# Patient Record
Sex: FEMALE | Race: WHITE | NOT HISPANIC OR LATINO | Employment: STUDENT | URBAN - METROPOLITAN AREA
[De-identification: names, ages, dates, MRNs, and addresses within clinical notes are randomized per-mention and may not be internally consistent; named-entity substitution may affect disease eponyms.]

---

## 2017-04-15 ENCOUNTER — OFFICE VISIT (OUTPATIENT)
Dept: URGENT CARE | Facility: CLINIC | Age: 22
End: 2017-04-15
Payer: COMMERCIAL

## 2017-04-15 VITALS
DIASTOLIC BLOOD PRESSURE: 80 MMHG | HEART RATE: 102 BPM | SYSTOLIC BLOOD PRESSURE: 110 MMHG | WEIGHT: 123 LBS | OXYGEN SATURATION: 99 % | TEMPERATURE: 98.4 F | BODY MASS INDEX: 19.26 KG/M2 | RESPIRATION RATE: 16 BRPM

## 2017-04-15 DIAGNOSIS — R05.8 NON-PRODUCTIVE COUGH: ICD-10-CM

## 2017-04-15 PROCEDURE — 99214 OFFICE O/P EST MOD 30 MIN: CPT | Performed by: PHYSICIAN ASSISTANT

## 2017-04-15 RX ORDER — PROMETHAZINE HYDROCHLORIDE AND CODEINE PHOSPHATE 6.25; 1 MG/5ML; MG/5ML
5-10 SYRUP ORAL 3 TIMES DAILY PRN
Qty: 150 ML | Refills: 0 | Status: SHIPPED | OUTPATIENT
Start: 2017-04-15 | End: 2020-07-22

## 2017-04-15 RX ORDER — BENZONATATE 200 MG/1
200 CAPSULE ORAL 3 TIMES DAILY PRN
Qty: 21 CAP | Refills: 0 | Status: SHIPPED | OUTPATIENT
Start: 2017-04-15 | End: 2020-07-22

## 2017-04-15 RX ORDER — AZITHROMYCIN 250 MG/1
TABLET, FILM COATED ORAL
Qty: 6 TAB | Refills: 1 | Status: SHIPPED | OUTPATIENT
Start: 2017-04-15 | End: 2020-07-22

## 2017-04-15 ASSESSMENT — ENCOUNTER SYMPTOMS
SHORTNESS OF BREATH: 0
COUGH: 1
CARDIOVASCULAR NEGATIVE: 1
FEVER: 0
CONSTITUTIONAL NEGATIVE: 1
SPUTUM PRODUCTION: 0
EYES NEGATIVE: 1
SORE THROAT: 0

## 2017-04-15 NOTE — PROGRESS NOTES
Subjective:      Ally Perez is a 21 y.o. female who presents with Cough            Cough  This is a new problem. The current episode started in the past 7 days. The problem has been unchanged. The problem occurs every few minutes. The cough is non-productive. Pertinent negatives include no fever, nasal congestion, sore throat or shortness of breath. Nothing aggravates the symptoms. She has tried nothing for the symptoms. The treatment provided no relief. There is no history of asthma or environmental allergies.       Review of Systems   Constitutional: Negative.  Negative for fever.   HENT: Negative.  Negative for sore throat.    Eyes: Negative.    Respiratory: Positive for cough. Negative for sputum production and shortness of breath.    Cardiovascular: Negative.    Skin: Negative.    Endo/Heme/Allergies: Negative for environmental allergies.          Objective:     /80 mmHg  Pulse 102  Temp(Src) 36.9 °C (98.4 °F)  Resp 16  Wt 55.792 kg (123 lb)  SpO2 99%     Physical Exam   Constitutional: She is oriented to person, place, and time. She appears well-developed and well-nourished. No distress.   HENT:   Head: Normocephalic and atraumatic.   Mouth/Throat: Oropharynx is clear and moist.   Eyes: EOM are normal. Pupils are equal, round, and reactive to light.   Neck: Normal range of motion. Neck supple.   Cardiovascular: Normal rate.    Pulmonary/Chest: Effort normal and breath sounds normal. No respiratory distress. She has no wheezes. She has no rales.   Lymphadenopathy:     She has no cervical adenopathy.   Neurological: She is alert and oriented to person, place, and time.   Skin: Skin is warm and dry.   Psychiatric: She has a normal mood and affect. Her behavior is normal. Judgment and thought content normal.   Nursing note and vitals reviewed.    Filed Vitals:    04/15/17 1057   BP: 110/80   Pulse: 102   Temp: 36.9 °C (98.4 °F)   Resp: 16   Weight: 55.792 kg (123 lb)   SpO2: 99%     Active  Ambulatory Problems     Diagnosis Date Noted   • Family planning 01/04/2016     Resolved Ambulatory Problems     Diagnosis Date Noted   • No Resolved Ambulatory Problems     Past Medical History   Diagnosis Date   • Migraine      Current Outpatient Prescriptions on File Prior to Visit   Medication Sig Dispense Refill   • SPRINTEC 28 0.25-35 MG-MCG per tablet TAKE ONE TABLET BY MOUTH ONCE DAILY 28 Tab 11     No current facility-administered medications on file prior to visit.     Gargles, Cepacol lozenges, Aleve/Advil as needed for throat pain  History reviewed. No pertinent family history.  Review of patient's allergies indicates no known allergies.              Assessment/Plan:     1. Non-productive cough     - benzonatate (TESSALON) 200 MG capsule; Take 1 Cap by mouth 3 times a day as needed for Cough.  Dispense: 21 Cap; Refill: 0  - azithromycin (ZITHROMAX) 250 MG Tab; z-dimitrios; U.D.  Dispense: 6 Tab; Refill: 1

## 2017-04-15 NOTE — MR AVS SNAPSHOT
Ally Perez   4/15/2017 10:55 AM   Office Visit   MRN: [id number]    Department:  [facility]   Dept Phone:  [phone]    Description:  Female : 1995   Provider:  Morris Naik PA-C           Reason for Visit     Cough x 3 wks, some productive cough, hard to breath      Allergies as of 4/15/2017     No Known Allergies      You were diagnosed with     Non-productive cough   [[id number]]         Vital Signs     Blood Pressure Pulse Temperature Respirations Weight Oxygen Saturation    110/80 mmHg 102 36.9 °C (98.4 °F) 16 55.792 kg (123 lb) 99%    Smoking Status                   Never Smoker            Basic Information     Date Of Birth Sex Race Ethnicity Preferred Language    1995 Female White Non- English      Problem List              ICD-10-CM Priority Class Noted - Resolved    Family planning Z30.09   2016 - Present      Health Maintenance        Date Due Completion Dates    IMM HEP B VACCINE (1 of 3 - Primary Series) 1997 ---    IMM HEP A VACCINE (1 of 2 - Standard Series) 1998 ---    IMM HPV VACCINE (1 of 3 - Female 3 Dose Series) 2008 ---    IMM VARICELLA (CHICKENPOX) VACCINE (2 of 2 - 2 Dose Childhood Series) 2009    IMM MENINGOCOCCAL VACCINE (MCV4) (1 of 1) 2013 ---    IMM DTaP/Tdap/Td Vaccine (2 - Td) 2019            Current Immunizations     Tdap Vaccine 2009    Varicella Vaccine Live 2009      Below and/or attached are the medications your provider expects you to take. Review all of your home medications and newly ordered medications with your provider and/or pharmacist. Follow medication instructions as directed by your provider and/or pharmacist. Please keep your medication list with you and share with your provider. Update the information when medications are discontinued, doses are changed, or new medications (including over-the-counter products) are added; and carry medication information at all times in  the event of emergency situations     Allergies:  No Known Allergies          Medications  Valid as of: April 15, 2017 - 11:15 AM    Generic Name Brand Name Tablet Size Instructions for use    Azithromycin (Tab) ZITHROMAX 250 MG z-dimitrios; U.D.        Benzonatate (Cap) TESSALON 200 MG Take 1 Cap by mouth 3 times a day as needed for Cough.        Norgestimate-Eth Estradiol (Tab) SPRINTEC 28 0.25-35 MG-MCG TAKE ONE TABLET BY MOUTH ONCE DAILY        Promethazine-Codeine (Syrup) PHENERGAN-CODEINE 6.25-10 MG/5ML Take 5-10 mL by mouth 3 times a day as needed for Cough (or use qhs).        .                 Medicines prescribed today were sent to:     [id number] - [place], [place] - [address]    [address] [place] [address]    Phone: [phone] Fax: [phone]    Open 24 Hours?: No    [facility] [address] [place] [address] [address] [address], [place] [address]    [address]  [employer], [address] [address]    Phone: [phone] Fax: [phone]    Open 24 Hours?: No      Medication refill instructions:       If your prescription bottle indicates you have medication refills left, it is not necessary to call your provider’s office. Please contact your pharmacy and they will refill your medication.    If your prescription bottle indicates you do not have any refills left, you may request refills at any time through one of the following ways: The online [employer] system (except Urgent Care), by calling your provider’s office, or by asking your pharmacy to contact your provider’s office with a refill request. Medication refills are processed only during regular business hours and may not be available until the next business day. Your provider may request additional information or to have a follow-up visit with you prior to refilling your medication.   *Please Note: Medication refills are assigned a new Rx number when refilled electronically. Your pharmacy may indicate that no refills were authorized even though a new  prescription for the same medication is available at the pharmacy. Please request the medicine by name with the pharmacy before contacting your provider for a refill.           MyChart Access Code: Activation code not generated  Current MyChart Status: Active

## 2017-09-22 RX ORDER — NORGESTIMATE AND ETHINYL ESTRADIOL 0.25-0.035
KIT ORAL
Qty: 84 TAB | Refills: 0 | Status: SHIPPED | OUTPATIENT
Start: 2017-09-22 | End: 2020-07-22

## 2017-09-22 NOTE — TELEPHONE ENCOUNTER
Was the patient seen in the last year in this department? No 1/04/16    Does patient have an active prescription for medications requested? No     Received Request Via: Pharmacy

## 2019-08-12 ENCOUNTER — GYNECOLOGY VISIT (OUTPATIENT)
Dept: OBGYN | Facility: CLINIC | Age: 24
End: 2019-08-12
Payer: COMMERCIAL

## 2019-08-12 ENCOUNTER — HOSPITAL ENCOUNTER (OUTPATIENT)
Facility: MEDICAL CENTER | Age: 24
End: 2019-08-12
Attending: NURSE PRACTITIONER
Payer: COMMERCIAL

## 2019-08-12 VITALS — SYSTOLIC BLOOD PRESSURE: 90 MMHG | DIASTOLIC BLOOD PRESSURE: 60 MMHG | WEIGHT: 118 LBS | BODY MASS INDEX: 18.48 KG/M2

## 2019-08-12 DIAGNOSIS — Z01.419 WELL WOMAN EXAM WITH ROUTINE GYNECOLOGICAL EXAM: Primary | ICD-10-CM

## 2019-08-12 DIAGNOSIS — Z01.419 WELL WOMAN EXAM WITH ROUTINE GYNECOLOGICAL EXAM: ICD-10-CM

## 2019-08-12 PROCEDURE — 87491 CHLMYD TRACH DNA AMP PROBE: CPT

## 2019-08-12 PROCEDURE — 90651 9VHPV VACCINE 2/3 DOSE IM: CPT | Performed by: NURSE PRACTITIONER

## 2019-08-12 PROCEDURE — 87591 N.GONORRHOEAE DNA AMP PROB: CPT

## 2019-08-12 PROCEDURE — 99385 PREV VISIT NEW AGE 18-39: CPT | Mod: 25 | Performed by: NURSE PRACTITIONER

## 2019-08-12 PROCEDURE — 88175 CYTOPATH C/V AUTO FLUID REDO: CPT

## 2019-08-12 PROCEDURE — 90471 IMMUNIZATION ADMIN: CPT | Performed by: NURSE PRACTITIONER

## 2019-08-12 RX ORDER — NORGESTIMATE AND ETHINYL ESTRADIOL 7DAYSX3 28
1 KIT ORAL DAILY
Qty: 28 TAB | Refills: 11 | Status: SHIPPED
Start: 2019-08-12 | End: 2020-07-22

## 2019-08-12 NOTE — PROGRESS NOTES
Ally Perez is a 24 y.o. y.o. female who presents for her Gynecologic Exam        HPI Comments: Pt presents for well woman exam. Pt has complaints of increasing acne since stopping her BCP 6 months ago. Would like another birth control to try to help. Patient's last menstrual period was 08/02/2019 (exact date).    Ally is here to establish care, get her first pap smear done, discuss birth control, and also to start the HPV series.  She is not currently sexually active, but has been in the past with male partners only. No concerns for STI or other infection.  She was on Sprintec until about 6 months ago. No pregnancy history.    Discussed with patient today were forms of birth control. We reviewed birth control pills and their use, risks benefits and side effects. I reviewed Depo-Provera use as well as risk-benefit side effect, I also discussed with the patient NuvaRing risks benefits and side effects. We also discussed IUDs, discussed progesterone containing IUDs such as Mirena and [place]. I also discussed ParaGard IUD. I discussed risks benefits and side effects of all these medications. We also discussed Nexplanon, subdermal implant, risks benefits and side effects.  Also discussed with patient were barrier methods especially condoms for prevention of STDs.      Review of Systems   Pertinent positives documented in HPI and all other systems reviewed & are negative    All PMH, PSH, allergies, social history and FH reviewed and updated today:  Past Medical History:   Diagnosis Date   • Migraine      History reviewed. No pertinent surgical history.  Patient has no known allergies.  Social History     Socioeconomic History   • Marital status: Single     Spouse name: Not on file   • Number of children: Not on file   • Years of education: Not on file   • Highest education level: Not on file   Occupational History   • Not on file   Social Needs   • Financial resource strain: Not on file   • Food insecurity:     Worry:  Not on file     Inability: Not on file   • Transportation needs:     Medical: Not on file     Non-medical: Not on file   Tobacco Use   • Smoking status: Never Smoker   • Smokeless tobacco: Never Used   Substance and Sexual Activity   • Alcohol use: Yes     Comment: once a week   • Drug use: No   • Sexual activity: Not Currently     Partners: Male     Comment: none   Lifestyle   • Physical activity:     Days per week: Not on file     Minutes per session: Not on file   • Stress: Not on file   Relationships   • Social connections:     Talks on phone: Not on file     Gets together: Not on file     Attends Confucianist service: Not on file     Active member of club or organization: Not on file     Attends meetings of clubs or organizations: Not on file     Relationship status: Not on file   • Intimate partner violence:     Fear of current or ex partner: Not on file     Emotionally abused: Not on file     Physically abused: Not on file     Forced sexual activity: Not on file   Other Topics Concern   • Behavioral problems Not Asked   • Interpersonal relationships Not Asked   • Sad or not enjoying activities Not Asked   • Suicidal thoughts Not Asked   • Poor school performance Not Asked   • Reading difficulties Not Asked   • Speech difficulties Not Asked   • Writing difficulties Not Asked   • Inadequate sleep Not Asked   • Excessive TV viewing Not Asked   • Excessive video game use Not Asked   • Inadequate exercise Not Asked   • Sports related Not Asked   • Poor diet Not Asked   • Family concerns for drug/alcohol abuse Not Asked   • Poor oral hygiene Not Asked   • Bike safety Not Asked   • Family concerns vehicle safety Not Asked   Social History Narrative   • Not on file     History reviewed. No pertinent family history.  Medications:   Current Outpatient Medications Ordered in Epic   Medication Sig Dispense Refill   • Norgestim-Eth Estrad Triphasic 0.18/0.215/0.25 MG-35 MCG Tab Take 1 Tab by mouth every day. 28 Tab 11   •  norgestimate-ethinyl estradiol (SPRINTEC 28) 0.25-35 MG-MCG per tablet TAKE ONE TABLET BY MOUTH ONCE DAILY (Patient not taking: Reported on 8/12/2019) 84 Tab 0   • benzonatate (TESSALON) 200 MG capsule Take 1 Cap by mouth 3 times a day as needed for Cough. (Patient not taking: Reported on 8/12/2019) 21 Cap 0   • azithromycin (ZITHROMAX) 250 MG Tab z-dimitrios; U.D. (Patient not taking: Reported on 8/12/2019) 6 Tab 1   • promethazine-codeine (PHENERGAN-CODEINE) 6.25-10 MG/5ML Syrup Take 5-10 mL by mouth 3 times a day as needed for Cough (or use qhs). (Patient not taking: Reported on 8/12/2019) 150 mL 0     No current Epic-ordered facility-administered medications on file.           Objective:   Vital measurements:  BP (!) 90/60   Wt 53.5 kg (118 lb)   Body mass index is 18.48 kg/m². (Goal BM I>18 <25)    Physical Exam     Chaperone offered: yes    Nursing note and vitals reviewed.  Constitutional: She is oriented to person, place, and time. She appears well-developed and well-nourished. No distress.     HEENT:   Head: Normocephalic and atraumatic.   Right Ear: External ear normal.   Left Ear: External ear normal.   Nose: Nose normal.   Eyes: Conjunctivae and EOM are normal. Pupils are equal, round, and reactive to light. No scleral icterus.     Neck: Normal range of motion. Neck supple. No tracheal deviation present. No thyromegaly present.     Pulmonary/Chest: Effort normal and breath sounds normal. No respiratory distress. She has no wheezes. She has no rales. She exhibits no tenderness.     Cardiovascular: Regular, rate and rhythm. No JVD.    Abdominal: Soft. Bowel sounds are normal. She exhibits no distension and no mass. No tenderness. She has no rebound and no guarding.     Breast:  Symmetrical, normal consistency without masses., No dimpling or skin changes, Normal nipples without discharge, no axillary lymphadenopathy, negative, Inspection negative. No nipple discharge or bleeding, No  masses    Genitourinary:  Pelvic exam was performed with patient supine.  External genitalia with no abnormal pigmentation, labial fusion,rash, tenderness, lesion or injury to the labia bilaterally.  Vagina is moist with no lesions, foul discharge, erythema, tenderness or bleeding. No foreign body around the vagina or signs of injury.   Cervix exhibits no motion tenderness, no discharge and no friability.   Uterus is mid-position, not deviated, not enlarged, not fixed and not tender.  Right adnexum displays no mass, no tenderness and no fullness. Left adnexum displays no mass, no tenderness and no fullness.     Musculoskeletal: Normal range of motion. She exhibits no edema and no tenderness.     Lymphadenopathy: She has no cervical adenopathy.     Neurological: She is alert and oriented to person, place, and time. She exhibits normal muscle tone.     Skin: Skin is warm and dry. No rash noted. She is not diaphoretic. No erythema. No pallor.     Psychiatric: She has a normal mood and affect. Her behavior is normal. Judgment and thought content normal.      Assessment:     1. Well woman exam with routine gynecological exam  THINPREP RFLX HPV ASCUS W/CTNG    9VHPV Vaccine 2-3 Dose IM     Plan:   Pap and physical exam performed  Monthly SBE.  Counseling: breast self exam, STD prevention and use and side effects of OCPs  Encourage exercise and proper diet.  Mammograms starting @ age 40 annually.  See medications and orders placed in encounter report.    Follow up annually or sooner ANDREAS Longo CNM, APRN

## 2019-08-15 LAB
C TRACH DNA GENITAL QL NAA+PROBE: NEGATIVE
CYTOLOGY REG CYTOL: NORMAL
N GONORRHOEA DNA GENITAL QL NAA+PROBE: NEGATIVE
SPECIMEN SOURCE: NORMAL

## 2019-09-03 ENCOUNTER — TELEPHONE (OUTPATIENT)
Dept: OBGYN | Facility: CLINIC | Age: 24
End: 2019-09-03

## 2019-09-03 DIAGNOSIS — Z30.41 ENCOUNTER FOR SURVEILLANCE OF CONTRACEPTIVE PILLS: ICD-10-CM

## 2019-09-03 RX ORDER — NORGESTIMATE AND ETHINYL ESTRADIOL 7DAYSX3 LO
1 KIT ORAL DAILY
Qty: 28 TAB | Refills: 0 | Status: SHIPPED | OUTPATIENT
Start: 2019-09-03 | End: 2020-07-22 | Stop reason: SDUPTHER

## 2019-09-03 NOTE — TELEPHONE ENCOUNTER
Received a call from Nury from pharmacy needing to clarify Rx for birth control. He needs to know brand name and if it's regular or Lo. Per Dr. Lund it is Ortho-Tri Cyclen regular. Nury notified and he had no other questions

## 2019-09-03 NOTE — TELEPHONE ENCOUNTER
Pt called requesting an Rx sent for her BC @ [facility] in [place] for 1 refill.

## 2019-09-10 ENCOUNTER — HOSPITAL ENCOUNTER (OUTPATIENT)
Facility: MEDICAL CENTER | Age: 24
End: 2019-09-10
Attending: DERMATOLOGY
Payer: COMMERCIAL

## 2019-09-10 PROCEDURE — 87205 SMEAR GRAM STAIN: CPT

## 2019-09-10 PROCEDURE — 87070 CULTURE OTHR SPECIMN AEROBIC: CPT

## 2019-09-11 ENCOUNTER — APPOINTMENT (RX ONLY)
Dept: URBAN - METROPOLITAN AREA CLINIC 36 | Facility: CLINIC | Age: 24
Setting detail: DERMATOLOGY
End: 2019-09-11

## 2019-09-11 DIAGNOSIS — L70.0 ACNE VULGARIS: ICD-10-CM

## 2019-09-11 PROCEDURE — ? PULSED-DYE LASER

## 2019-09-11 PROCEDURE — ? PRESCRIPTION

## 2019-09-11 PROCEDURE — ? ORDER TESTS

## 2019-09-11 RX ORDER — CLINDAMYCIN PHOSPHATE 10 MG/ML
LOTION TOPICAL
Qty: 1 | Refills: 6 | Status: ERX | COMMUNITY
Start: 2019-09-11

## 2019-09-11 RX ORDER — MINOCYCLINE HYDROCHLORIDE 100 MG/1
CAPSULE ORAL
Qty: 90 | Refills: 3 | Status: ERX | COMMUNITY
Start: 2019-09-11

## 2019-09-11 RX ADMIN — MINOCYCLINE HYDROCHLORIDE: 100 CAPSULE ORAL at 01:28

## 2019-09-11 RX ADMIN — CLINDAMYCIN PHOSPHATE: 10 LOTION TOPICAL at 01:17

## 2019-09-11 ASSESSMENT — LOCATION DETAILED DESCRIPTION DERM: LOCATION DETAILED: LEFT INFERIOR CENTRAL MALAR CHEEK

## 2019-09-11 ASSESSMENT — LOCATION SIMPLE DESCRIPTION DERM: LOCATION SIMPLE: LEFT CHEEK

## 2019-09-11 ASSESSMENT — LOCATION ZONE DERM: LOCATION ZONE: FACE

## 2019-09-11 NOTE — PROCEDURE: PULSED-DYE LASER
Delay Time (Ms): 20
Spot Size: 10 mm
Cryogen Time (Ms): 30
Laser Type: Vbeam 595nm
Pulse Duration: 6 ms
Post-Care Instructions: I reviewed with the patient in detail post-care instructions. Patient should stay away from the sun and wear sun protection until treated areas are fully healed.
Treated Area: medium area
Spot Size: 7 mm
Pulse Duration: 10 ms
Fluence In J/Cm2 (Optional): 6
Post-Procedure Care: [place] applied . Post care reviewed with patient.
Immediate Endpoint: erythema
Detail Level: Zone
Location Override: cheeks
Consent: Written consent obtained, risks reviewed including but not limited to crusting, scabbing, blistering, scarring, darker or lighter pigmentary change, incidental hair removal, bruising, and/or incomplete removal.

## 2019-09-11 NOTE — PROCEDURE: ORDER TESTS
Performing Laboratory: [id number]
Bill For Surgical Tray: no
Expected Date Of Service: 09/10/2019
Billing Type: Third-Party Bill

## 2019-09-12 LAB
AMBIGUOUS DTTM AMBI4: NORMAL
GRAM STN SPEC: NORMAL
SIGNIFICANT IND 70042: NORMAL
SIGNIFICANT IND 70042: NORMAL
SITE SITE: NORMAL
SITE SITE: NORMAL
SOURCE SOURCE: NORMAL
SOURCE SOURCE: NORMAL

## 2019-09-14 LAB
BACTERIA WND AEROBE CULT: NORMAL
GRAM STN SPEC: NORMAL
SIGNIFICANT IND 70042: NORMAL
SITE SITE: NORMAL
SOURCE SOURCE: NORMAL

## 2019-10-23 ENCOUNTER — TELEPHONE (OUTPATIENT)
Dept: OBGYN | Facility: CLINIC | Age: 24
End: 2019-10-23

## 2019-10-23 NOTE — TELEPHONE ENCOUNTER
Giselle, pt's mother called requesting to get a 12 month supply of pt's BC, states she is studying abroad and mom will ship medication .    I called pharmacy and approve refill,

## 2020-07-22 ENCOUNTER — OFFICE VISIT (OUTPATIENT)
Dept: MEDICAL GROUP | Facility: MEDICAL CENTER | Age: 25
End: 2020-07-22
Payer: COMMERCIAL

## 2020-07-22 VITALS
DIASTOLIC BLOOD PRESSURE: 58 MMHG | RESPIRATION RATE: 16 BRPM | HEIGHT: 68 IN | HEART RATE: 102 BPM | WEIGHT: 117.06 LBS | OXYGEN SATURATION: 98 % | BODY MASS INDEX: 17.74 KG/M2 | TEMPERATURE: 98.4 F | SYSTOLIC BLOOD PRESSURE: 98 MMHG

## 2020-07-22 DIAGNOSIS — Z23 NEED FOR VACCINATION: ICD-10-CM

## 2020-07-22 DIAGNOSIS — Z00.00 PREVENTATIVE HEALTH CARE: ICD-10-CM

## 2020-07-22 DIAGNOSIS — R55 VASOVAGAL NEAR SYNCOPE: ICD-10-CM

## 2020-07-22 DIAGNOSIS — Z30.41 ENCOUNTER FOR SURVEILLANCE OF CONTRACEPTIVE PILLS: ICD-10-CM

## 2020-07-22 DIAGNOSIS — R53.83 FATIGUE, UNSPECIFIED TYPE: ICD-10-CM

## 2020-07-22 PROCEDURE — 90471 IMMUNIZATION ADMIN: CPT | Performed by: INTERNAL MEDICINE

## 2020-07-22 PROCEDURE — 99204 OFFICE O/P NEW MOD 45 MIN: CPT | Mod: 25 | Performed by: INTERNAL MEDICINE

## 2020-07-22 PROCEDURE — 90651 9VHPV VACCINE 2/3 DOSE IM: CPT | Performed by: INTERNAL MEDICINE

## 2020-07-22 PROCEDURE — 93000 ELECTROCARDIOGRAM COMPLETE: CPT | Performed by: INTERNAL MEDICINE

## 2020-07-22 RX ORDER — NORGESTIMATE AND ETHINYL ESTRADIOL 7DAYSX3 LO
1 KIT ORAL DAILY
Qty: 84 TAB | Refills: 3 | Status: SHIPPED | OUTPATIENT
Start: 2020-07-22 | End: 2020-11-05 | Stop reason: SDUPTHER

## 2020-07-22 ASSESSMENT — PATIENT HEALTH QUESTIONNAIRE - PHQ9: CLINICAL INTERPRETATION OF PHQ2 SCORE: 0

## 2020-07-22 NOTE — PROGRESS NOTES
CC:  Diagnoses of Need for vaccination, Fatigue, unspecified type, Preventative health care, and Encounter for surveillance of contraceptive pills were pertinent to this visit.    HISTORY OF THE PRESENT ILLNESS: Patient is a 24 y.o. female. This pleasant patient is here today to establish care.     She says she feels well overall.  With the pandemic she is back home with her parents.  Trying to stay busy.    Has had some general fatigue, she cannot pinpoint the source, feels like she has some decreased concentration such as when she is watching TV.  Sleep is good, 7 to 8 hours nightly.  She says her diet is very healthy, vegetarian.  Denies any stress or depression.  Only medication is birth control.  She says her menstrual cycles are at normal intervals, no excessive blood loss.  No cardiopulmonary, GI symptoms.  No focal muscle/joint or skin changes, no hair loss.  No other associated change.  Heart rate a little elevated in clinic, she says sometimes she gets little dizzy when she stands quickly from a seated position, she will hydrate more and see if this resolves.    Regarding preventive health she is agreeable to have her HPV vaccine today.  She will research Trumenba, which was also recommended.  Pap smear is up-to-date, she has no current gynecologic concerns.    Did have history of migraines when she was going through puberty but these have resolved and have not recurred over the past 4 years.  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    Allergies: Patient has no known allergies.    Current Outpatient Medications Ordered in Epic   Medication Sig Dispense Refill   • Norgestim-Eth Estrad Triphasic 0.18/0.215/0.25 MG-25 MCG Tab Take 1 Cap by mouth every day. 84 Tab 3     No current Epic-ordered facility-administered medications on file.        Past Medical History:   Diagnosis Date   • Migraine        History reviewed. No pertinent surgical history.    Social History     Tobacco Use   •  "Smoking status: Never Smoker   • Smokeless tobacco: Never Used   Substance Use Topics   • Alcohol use: Yes     Comment: once a week   • Drug use: No       Social History     Social History Narrative   • Not on file       Family History   Problem Relation Age of Onset   • No Known Problems Father    • No Known Problems Brother    • No Known Problems Brother    • No Known Problems Mother        ROS:     - Constitutional: Negative for fever, chills, unexpected weight change, night sweats    - Eyes:   Negative for blurry vision, eye pain, discharge    - ENT:  Negative for hearing changes, ear pain, ear discharge, rhinorrhea, sinus congestion, sore throat     - Respiratory: Negative for cough, sputum production, chest congestion, dyspnea, wheezing, and crackles.      - Cardiovascular: Negative for chest pain, palpitations, orthopnea, and bilateral lower extremity edema.     - Gastrointestinal: Negative for heartburn, nausea, vomiting, abdominal pain, hematochezia, melena, diarrhea, constipation, and greasy/foul-smelling stools.     - Genitourinary: Negative for dysuria, polyuria, hematuria, pyuria, urinary urgency, and urinary incontinence.     - Musculoskeletal: Negative for myalgias, back pain, and joint pain.     - Skin: Negative for rash, itching, cyanotic skin color change.     - Neurological: Negative for migraines, numbness, ataxia, tremors, vertigo    - Endo:Negative for polyuria, heat/cold intolerance, excessive thirst    - Hem/lymphatic: Negative for easy bruising, blood clots, lymphedema, swollen glands    -Allergic/immun: Negative for allergic rhinitis    - Psychiatric/Behavioral: Negative for depression, suicidal/homicidal ideation and memory loss.      Exam: /56 (BP Location: Right arm, Patient Position: Sitting, BP Cuff Size: Adult)   Pulse (!) 102   Temp 36.9 °C (98.4 °F) (Temporal)   Resp 16   Ht 1.727 m (5' 8\")   Wt 53.1 kg (117 lb 1 oz)   SpO2 98%  Body mass index is 17.8 kg/m².    General: " Normal appearing. No distress.  EYES: Conjunctiva clear lids without ptosis, pupils equal  EARS: Normal shape and contour   NOSE, THROAT: nasal mucosa benign. oropharynx is without erythema, edema or exudates.   Neck: Supple without LAD. Thyroid is not enlarged.  Pulmonary: Clear to ausculation.  Normal effort. No rales or wheezing.  Cardiovascular: Regular rate and rhythm without significant murmur.   Abdomen: Soft, nontender, nondistended. Normal bowel sounds.  Neurologic: Cranial nerves grossly nonfocal  Lymph: No cervical, supraclavicular nodes palpable  Skin: Warm and dry.  No obvious lesions.  Musculoskeletal: Normal gait. No extremity cyanosis, clubbing, or edema.  Psych: Normal mood and affect. Alert and oriented x3. Judgment and insight is normal.    EKG today in clinic sinus bradycardia rate of 53,  ms, I do not appreciate any signs of Brugada, preexcitation, or other acute changes.    Assessment/Plan  1. Need for vaccination  She will make a medical assistant appointment to complete HPV vaccine, meningococcal, etc.  - Gardasil 9    2. Fatigue, unspecified type  Unclear cause of the generalized mild fatigue.  For now we will plan to check labs, especially B12 with her vegetarian status.  - CBC WITH DIFFERENTIAL; Future  - Comp Metabolic Panel; Future  - TSH; Future  - FREE THYROXINE; Future  - VITAMIN D,25 HYDROXY; Future  - VITAMIN B12; Future    3. Preventative health care  - CBC WITH DIFFERENTIAL; Future  - Comp Metabolic Panel; Future  - Lipid Profile; Future  - TSH; Future  - FREE THYROXINE; Future  - VITAMIN D,25 HYDROXY; Future  - VITAMIN B12; Future    4. Encounter for surveillance of contraceptive pills  - Norgestim-Eth Estrad Triphasic 0.18/0.215/0.25 MG-25 MCG Tab; Take 1 Cap by mouth every day.  Dispense: 84 Tab; Refill: 3    5.  Vasovagal  After appointment patient did receive her HPV vaccine.  Following the vaccine she started to become very dizzy, some tunnel vision, had near syncopal  event.  Team placed her in supine position and blood pressure was noted to be 90/50.  Started to recover, felt a little better, we sat her up blood pressure 70/52 and standing 62/42.  She says that she has never had a vasovagal episode previously.  She drank a lot of water and waited in clinic, orthostatic vital signs were rechecked laying 102/54, sitting 98/62 and standing 98/58.  She says her dizziness and related symptoms did resolve.  Both of her parents were called. Mother states she and her son have had vasovagal episodes.  Ultimately since blood pressure did significantly improve and symptoms resolved, thought to just be due to vasovagal episode in the setting of positive orthostatic vital signs.  She will drink a lot more water.  To her fatigue labs we will add cortisol level but adrenal insufficiency is not highly suspected at this time.  If vasovagal/presyncope symptoms recur, recommended to go to ER for evaluation.  Mom came to pick her up from clinic.  EKG was obtained today in clinic showed no concerning findings.        Patient prefers annual follow-up for wellness visit.  Sooner if needed based on labs, if fatigue does not resolve, further vasovagal episode, etc.      Please note that this dictation was created using voice recognition software. I have made every reasonable attempt to correct obvious errors, but I expect that there are errors of grammar and possibly content that I did not discover before finalizing the note.

## 2020-07-31 DIAGNOSIS — Z20.822 EXPOSURE TO COVID-19 VIRUS: ICD-10-CM

## 2020-08-01 ENCOUNTER — HOSPITAL ENCOUNTER (OUTPATIENT)
Dept: LAB | Facility: MEDICAL CENTER | Age: 25
End: 2020-08-01
Attending: PHYSICIAN ASSISTANT
Payer: COMMERCIAL

## 2020-08-01 DIAGNOSIS — Z20.822 EXPOSURE TO COVID-19 VIRUS: ICD-10-CM

## 2020-08-01 LAB
COVID ORDER STATUS COVID19: NORMAL
SARS-COV-2 RNA RESP QL NAA+PROBE: NOTDETECTED
SPECIMEN SOURCE: NORMAL

## 2020-08-01 PROCEDURE — C9803 HOPD COVID-19 SPEC COLLECT: HCPCS

## 2020-08-01 PROCEDURE — U0003 INFECTIOUS AGENT DETECTION BY NUCLEIC ACID (DNA OR RNA); SEVERE ACUTE RESPIRATORY SYNDROME CORONAVIRUS 2 (SARS-COV-2) (CORONAVIRUS DISEASE [COVID-19]), AMPLIFIED PROBE TECHNIQUE, MAKING USE OF HIGH THROUGHPUT TECHNOLOGIES AS DESCRIBED BY CMS-2020-01-R: HCPCS

## 2020-08-06 ENCOUNTER — HOSPITAL ENCOUNTER (OUTPATIENT)
Dept: LAB | Facility: MEDICAL CENTER | Age: 25
End: 2020-08-06
Attending: INTERNAL MEDICINE
Payer: COMMERCIAL

## 2020-08-06 DIAGNOSIS — Z00.00 PREVENTATIVE HEALTH CARE: ICD-10-CM

## 2020-08-06 DIAGNOSIS — R53.83 FATIGUE, UNSPECIFIED TYPE: ICD-10-CM

## 2020-08-06 LAB
25(OH)D3 SERPL-MCNC: 60 NG/ML (ref 30–100)
ALBUMIN SERPL BCP-MCNC: 4.7 G/DL (ref 3.2–4.9)
ALBUMIN/GLOB SERPL: 1.6 G/DL
ALP SERPL-CCNC: 49 U/L (ref 30–99)
ALT SERPL-CCNC: 15 U/L (ref 2–50)
ANION GAP SERPL CALC-SCNC: 11 MMOL/L (ref 7–16)
AST SERPL-CCNC: 20 U/L (ref 12–45)
BASOPHILS # BLD AUTO: 1.5 % (ref 0–1.8)
BASOPHILS # BLD: 0.06 K/UL (ref 0–0.12)
BILIRUB SERPL-MCNC: 0.4 MG/DL (ref 0.1–1.5)
BUN SERPL-MCNC: 11 MG/DL (ref 8–22)
CALCIUM SERPL-MCNC: 10.1 MG/DL (ref 8.5–10.5)
CHLORIDE SERPL-SCNC: 101 MMOL/L (ref 96–112)
CHOLEST SERPL-MCNC: 193 MG/DL (ref 100–199)
CO2 SERPL-SCNC: 25 MMOL/L (ref 20–33)
CORTIS SERPL-MCNC: 36.2 UG/DL (ref 0–23)
CREAT SERPL-MCNC: 0.58 MG/DL (ref 0.5–1.4)
EOSINOPHIL # BLD AUTO: 0.03 K/UL (ref 0–0.51)
EOSINOPHIL NFR BLD: 0.8 % (ref 0–6.9)
ERYTHROCYTE [DISTWIDTH] IN BLOOD BY AUTOMATED COUNT: 43.4 FL (ref 35.9–50)
EST. AVERAGE GLUCOSE BLD GHB EST-MCNC: 103 MG/DL
GLOBULIN SER CALC-MCNC: 2.9 G/DL (ref 1.9–3.5)
GLUCOSE SERPL-MCNC: 80 MG/DL (ref 65–99)
HBA1C MFR BLD: 5.2 % (ref 0–5.6)
HCT VFR BLD AUTO: 46.1 % (ref 37–47)
HDLC SERPL-MCNC: 71 MG/DL
HGB BLD-MCNC: 14.9 G/DL (ref 12–16)
IMM GRANULOCYTES # BLD AUTO: 0 K/UL (ref 0–0.11)
IMM GRANULOCYTES NFR BLD AUTO: 0 % (ref 0–0.9)
LDLC SERPL CALC-MCNC: 102 MG/DL
LYMPHOCYTES # BLD AUTO: 2.16 K/UL (ref 1–4.8)
LYMPHOCYTES NFR BLD: 55 % (ref 22–41)
MCH RBC QN AUTO: 29.6 PG (ref 27–33)
MCHC RBC AUTO-ENTMCNC: 32.3 G/DL (ref 33.6–35)
MCV RBC AUTO: 91.7 FL (ref 81.4–97.8)
MONOCYTES # BLD AUTO: 0.2 K/UL (ref 0–0.85)
MONOCYTES NFR BLD AUTO: 5.1 % (ref 0–13.4)
NEUTROPHILS # BLD AUTO: 1.48 K/UL (ref 2–7.15)
NEUTROPHILS NFR BLD: 37.6 % (ref 44–72)
NRBC # BLD AUTO: 0 K/UL
NRBC BLD-RTO: 0 /100 WBC
PLATELET # BLD AUTO: 204 K/UL (ref 164–446)
PMV BLD AUTO: 9.7 FL (ref 9–12.9)
POTASSIUM SERPL-SCNC: 4 MMOL/L (ref 3.6–5.5)
PROT SERPL-MCNC: 7.6 G/DL (ref 6–8.2)
RBC # BLD AUTO: 5.03 M/UL (ref 4.2–5.4)
SODIUM SERPL-SCNC: 137 MMOL/L (ref 135–145)
T4 FREE SERPL-MCNC: 1.19 NG/DL (ref 0.93–1.7)
TRIGL SERPL-MCNC: 102 MG/DL (ref 0–149)
TSH SERPL DL<=0.005 MIU/L-ACNC: 2.25 UIU/ML (ref 0.38–5.33)
VIT B12 SERPL-MCNC: 978 PG/ML (ref 211–911)
WBC # BLD AUTO: 3.9 K/UL (ref 4.8–10.8)

## 2020-08-06 PROCEDURE — 80061 LIPID PANEL: CPT

## 2020-08-06 PROCEDURE — 82607 VITAMIN B-12: CPT

## 2020-08-06 PROCEDURE — 84443 ASSAY THYROID STIM HORMONE: CPT

## 2020-08-06 PROCEDURE — 80053 COMPREHEN METABOLIC PANEL: CPT

## 2020-08-06 PROCEDURE — 36415 COLL VENOUS BLD VENIPUNCTURE: CPT

## 2020-08-06 PROCEDURE — 82306 VITAMIN D 25 HYDROXY: CPT

## 2020-08-06 PROCEDURE — 82533 TOTAL CORTISOL: CPT

## 2020-08-06 PROCEDURE — 84439 ASSAY OF FREE THYROXINE: CPT

## 2020-08-06 PROCEDURE — 83036 HEMOGLOBIN GLYCOSYLATED A1C: CPT

## 2020-08-06 PROCEDURE — 85025 COMPLETE CBC W/AUTO DIFF WBC: CPT

## 2020-08-25 ENCOUNTER — OFFICE VISIT (OUTPATIENT)
Dept: MEDICAL GROUP | Facility: MEDICAL CENTER | Age: 25
End: 2020-08-25
Payer: COMMERCIAL

## 2020-08-25 VITALS
SYSTOLIC BLOOD PRESSURE: 100 MMHG | BODY MASS INDEX: 17.74 KG/M2 | DIASTOLIC BLOOD PRESSURE: 60 MMHG | RESPIRATION RATE: 16 BRPM | TEMPERATURE: 99 F | WEIGHT: 117.06 LBS | OXYGEN SATURATION: 98 % | HEART RATE: 78 BPM | HEIGHT: 68 IN

## 2020-08-25 DIAGNOSIS — R79.89 ELEVATED CORTISOL LEVEL: ICD-10-CM

## 2020-08-25 DIAGNOSIS — D70.9 NEUTROPENIA, UNSPECIFIED TYPE (HCC): ICD-10-CM

## 2020-08-25 DIAGNOSIS — R53.83 FATIGUE, UNSPECIFIED TYPE: ICD-10-CM

## 2020-08-25 PROBLEM — R55 VASOVAGAL RESPONSE: Status: ACTIVE | Noted: 2020-08-25

## 2020-08-25 PROCEDURE — 99214 OFFICE O/P EST MOD 30 MIN: CPT | Performed by: INTERNAL MEDICINE

## 2020-08-25 ASSESSMENT — FIBROSIS 4 INDEX: FIB4 SCORE: 0.58

## 2020-08-25 NOTE — PROGRESS NOTES
CC:  The encounter diagnosis was Fatigue, unspecified type.    HISTORY OF THE PRESENT ILLNESS: Patient is a 25 y.o. female. This pleasant patient is here today for routine follow-up.    At last appointment in July did had a vasovagal reaction after HPV vaccine.  She has had no further syncopal events.  She does know that she is currently due for Trumenba vaccine and she wishes to postpone this for now--advised to get it done before turning age 26.    For fatigue symptoms, labs showed normal GFR, A1c, B12, vitamin D, TSH, CMP.  CBC did incidentally show WBC of 3.9 with percent neutrophils minimally low 37.6 absolute count 1.48.  She has no fever, chills, night sweats.  She does not think she has any viral infections.    Incidentally her cortisol level was elevated at 36.2, this was at nearly 10 AM.  He had check cortisol because of her fatigue, occasional dizziness with standing, her prolonged low blood pressure after her vasovagal episode that did eventually respond to hydration.  She says she was very stressed at the time of her blood draw.  Also noted on oral contraceptive medication.  She has no striae, weight gain, or Cushing's type symptoms.    Allergies: Patient has no known allergies.    Current Outpatient Medications Ordered in Epic   Medication Sig Dispense Refill   • Norgestim-Eth Estrad Triphasic 0.18/0.215/0.25 MG-25 MCG Tab Take 1 Cap by mouth every day. 84 Tab 3     No current Epic-ordered facility-administered medications on file.        Past Medical History:   Diagnosis Date   • Migraine        No past surgical history on file.    Social History     Tobacco Use   • Smoking status: Never Smoker   • Smokeless tobacco: Never Used   Substance Use Topics   • Alcohol use: Yes     Comment: once a week   • Drug use: No       Social History     Social History Narrative   • Not on file       Family History   Problem Relation Age of Onset   • No Known Problems Father    • No Known Problems Brother    • No Known  "Problems Brother    • No Known Problems Mother        ROS:     - Constitutional: Negative for fever, chills, unexpected weight change, night sweats    - Eyes:   Negative for blurry vision, eye pain, discharge    - ENT:  Negative for hearing changes, ear pain, ear discharge, rhinorrhea, sinus congestion, sore throat     - Respiratory: Negative for cough, sputum production, chest congestion, dyspnea, wheezing, and crackles.      - Cardiovascular: Negative for chest pain, palpitations, orthopnea, and bilateral lower extremity edema.     - Gastrointestinal: Negative for heartburn, nausea, vomiting, abdominal pain, hematochezia, melena, diarrhea, constipation, and greasy/foul-smelling stools.     - Genitourinary: Negative for dysuria    - Musculoskeletal: Negative for myalgias, back pain, and joint pain.     - Skin: Negative for rash, itching, cyanotic skin color change.     - Neurological: Negative for vertigo    - Endo:Negative for polyuria, heat/cold intolerance, excessive thirst    - Hem/lymphatic: Negative for swollen glands    -Allergic/immun: Negative for allergic rhinitis    - Psychiatric/Behavioral: Negative for depression, suicidal/homicidal ideation and memory loss.      Exam: /60 (BP Location: Right arm, Patient Position: Sitting, BP Cuff Size: Adult)   Pulse 78   Temp 37.2 °C (99 °F) (Temporal)   Resp 16   Ht 1.727 m (5' 8\")   Wt 53.1 kg (117 lb 1 oz)   SpO2 98%  Body mass index is 17.8 kg/m².    General: Normal appearing. No distress.  EYES: Conjunctiva clear lids without ptosis, pupils equal  EARS: Normal shape and contour   Pulmonary: Clear to ausculation.  Normal effort. No rales or wheezing.  Cardiovascular: Regular rate and rhythm without significant murmur.   Abdomen: Soft, nontender, nondistended. Normal bowel sounds.  Neurologic: Cranial nerves grossly nonfocal  Skin: Warm and dry.  No obvious lesions.  Musculoskeletal: Normal gait. No extremity cyanosis, clubbing, or edema.  Psych: " Normal mood and affect. Alert and oriented x3. Judgment and insight is normal.      Assessment/Plan  1. Fatigue, unspecified type  Chronic, stable. I did curbside 1 of our endocrinologists about the patient's ongoing fatigue, intermittent dizziness with standing quickly, etc. symptoms elaborated on our initial appointment roughly 1 month ago.  There has been no history to suggest mood disorder, poor diet or sleep habits to explain her fatigue symptoms.  Labs were largely unremarkable CBC, B12, vitamin D, thyroid, CMP, etc.  I suspected that the elevated cortisol which was drawn at almost 10 AM showing level 36 was most likely due to benign cause such as stress (patient does say she was quite stressed getting her blood drawn after having a vasovagal reaction to the vaccine) and even could be due to medication as OCP can elevate cortisol.  The endocrinologist agreed that likely the elevated cortisol was of no clinical significance but could be reasonable to recheck again at 8 AM.  Regardless the cortisol level is not low and the primary reason we had checked this lab was because of her underlying symptoms and prolonged hypotension after vasovagal response.  The endocrinologist did recommend however check ferritin despite normal hemoglobin as a source of common fatigue in young healthy woman and so we will proceed with that.  -Recheck cortisol 8 AM  -Ferritin    2. Neutropenia, unspecified type (HCC)  New issue. Mild neutropenia of unk duration. No constitutional symptoms of concern.  Unclear cause of the mild neutropenia, could be congenital, medication, etc.  -Recheck CBC with folic acid    3. Elevated Cortisol  New finding. See #1 above likely expected finding/incidental and benign    RTC PRN or sooner if needed based on labs or new symptoms        Please note that this dictation was created using voice recognition software. I have made every reasonable attempt to correct obvious errors, but I expect that there are  errors of grammar and possibly content that I did not discover before finalizing the note.

## 2020-11-05 DIAGNOSIS — Z30.41 ENCOUNTER FOR SURVEILLANCE OF CONTRACEPTIVE PILLS: ICD-10-CM

## 2020-11-06 NOTE — TELEPHONE ENCOUNTER
Received request via: Patient    Was the patient seen in the last year in this department? Yes    Does the patient have an active prescription (recently filled or refills available) for medication(s) requested? No     Requested Prescriptions     Pending Prescriptions Disp Refills   • Norgestim-Eth Estrad Triphasic 0.18/0.215/0.25 MG-25 MCG Tab 84 Tab 3     Sig: Take 1 Cap by mouth every day.

## 2020-11-06 NOTE — TELEPHONE ENCOUNTER
From: Ally Perez  To: Office of Jolene Dwyer M.D.  Sent: 11/5/2020 2:33 PM PST  Subject: Medication Renewal Request    Refills have been requested for the following medications:     Norgestim-Eth Estrad Triphasic 0.18/0.215/0.25 MG-25 MCG Tab [Jolene Dwyer M.D.]    Preferred pharmacy: [address] [address]  Delivery method: Pickup

## 2020-11-09 RX ORDER — NORGESTIMATE AND ETHINYL ESTRADIOL 7DAYSX3 LO
1 KIT ORAL DAILY
Qty: 84 TAB | Refills: 3 | Status: SHIPPED | OUTPATIENT
Start: 2020-11-09 | End: 2021-09-24

## 2020-12-24 ENCOUNTER — APPOINTMENT (OUTPATIENT)
Dept: MEDICAL GROUP | Facility: MEDICAL CENTER | Age: 25
End: 2020-12-24
Payer: COMMERCIAL

## 2021-03-25 ENCOUNTER — TELEMEDICINE (OUTPATIENT)
Dept: OBGYN | Facility: CLINIC | Age: 26
End: 2021-03-25
Payer: COMMERCIAL

## 2021-03-25 DIAGNOSIS — N89.8 VAGINAL ITCHING: Primary | ICD-10-CM

## 2021-03-25 PROCEDURE — 99442 PR PHYSICIAN TELEPHONE EVALUATION 11-20 MIN: CPT | Mod: CR | Performed by: NURSE PRACTITIONER

## 2021-03-25 NOTE — PROGRESS NOTES
Telephone Appointment Visit   As a means of avoiding spread of COVID-19, this visit is being conducted by telephone. The patient is also in [place] at this time. This telephone visit was initiated by the patient and they verbally consented. Name and  was verified.  We attempted Zoom call however, no connection established.    Time at start of call: 955    Reason for Call:  New Symptom/ Concern: vaginal itching and pain    HPI:    The patient reports her symptoms of vaginal itching and pain started several months ago and the symtpms come and go about every 3 days or so.  She denies any abnormal dishcarge, odour.  She reports some mild burning with urination on occasion but not consistently.  She is sexually active with one male partner.  The partner has no complaints.   She also feels some discomfort with intercourse in vaginal area on and off.     Labs / Images Reviewed:   n/a     Assessment and Plan:     Yeast vs BV vs UTI vs STI: Given itching and vaginal pain, will start with OTC yeast infection medication.  Needs to check with insurance to see if covered to go to lab.  Will order STI labwork and pt can complete once knows if covered. Encouraged to use lubrication with intercourse if necessary.     Follow-up: Once back into town or PRN    Time at end of call: 1006  Total Time Spent: 11-20 minutes    KWAME Sethi